# Patient Record
Sex: FEMALE | Race: WHITE
[De-identification: names, ages, dates, MRNs, and addresses within clinical notes are randomized per-mention and may not be internally consistent; named-entity substitution may affect disease eponyms.]

---

## 2023-02-22 ENCOUNTER — HOSPITAL ENCOUNTER (EMERGENCY)
Dept: HOSPITAL 97 - ER | Age: 38
Discharge: HOME | End: 2023-02-22
Payer: COMMERCIAL

## 2023-02-22 VITALS — OXYGEN SATURATION: 100 % | SYSTOLIC BLOOD PRESSURE: 182 MMHG | TEMPERATURE: 98.6 F | DIASTOLIC BLOOD PRESSURE: 93 MMHG

## 2023-02-22 DIAGNOSIS — H60.91: Primary | ICD-10-CM

## 2023-02-22 DIAGNOSIS — I10: ICD-10-CM

## 2023-02-22 PROCEDURE — 99281 EMR DPT VST MAYX REQ PHY/QHP: CPT

## 2023-02-22 NOTE — XMS REPORT
Continuity of Care Document

                          Created on:2023



Patient:SHANTELLE BURNS

Sex:Female

:1985

External Reference #:[id number]





Demographics







                          Address                   [address] [place] [address]

 

                          Home Phone                [phone]

 

                          Work Phone                [phone]

 

                          Mobile Phone              [phone]

 

                          Email Address             [email or web address]

 

                          Preferred Language        English

 

                          Marital Status            Unknown

 

                          Faith Affiliation     Unknown

 

                          Race                      Unknown

 

                          Ethnic Group              Unknown









Author







                          Organization              [facility]

t

 

                          Address                   [address]



                                                    [place] [address]

 

                          Phone                     [phone]









Support







                Name            Relationship    Address         Phone

 

                LISET BURNS X               [address]    [phone]



                                                [place] [address] 









Care Team Providers







                    Name                Role                Phone

 

                    DEEJAY FERRARA Primary Care Physician Unavailable

 

                    Silvana Duron Attending Clinician [phone]

 

                    Unknown, Attending  Attending Clinician Unavailable

 

                    SILVANA FERGUSON     Attending Clinician Unavailable

 

                    EDENILSON            Attending Clinician Unavailable

 

                    JENA CAMPBELL       Attending Clinician Unavailable

 

                    Jena Pang   Attending Clinician [phone]

 

                    Doctor Unassigned, No Name Attending Clinician Unavailable

 

                    Provider, [facility] Attending Clinician Unavailable

 

                    Mady Attending Clinician Unavailable

 

                    Amy Mcdowell     Attending Clinician [phone]

 

                    BETH            Attending Clinician Unavailable

 

                    JULY_OBEY            Admitting Clinician Unavailable

 

                    Mady Admitting Clinician Unavailable

 

                    ANDREW_JAMES            Admitting Clinician Unavailable









Payers







           Payer Name Policy Type Policy Number Effective Date Expiration Date S

susie

 

           [facility]: [place]            [id number] 2017            



           - [employer] (POS)                       00:00:00              







Problems







       Condition Condition Condition Status Onset  Resolution Last   Treating Co

mments 

Source



       Name   Details Category        Date   Date   Treatment Clinician        



                                                 Date                 

 

       Family Family Problem Active 2022                             [place] [facility]



       history of History of               2-07                               Co

mmuni



       diabetes Diabetes               00:00:                             ty



       mellitus Mellitus               00                                 Hospit

a



       in first in First                                                  l



       degree Degree                                                  Clinics



       relative Relative                                                  

 

       Routine Routine Problem Active                              [facility]



                       7-24                               Medi

marii



       care   Care                 00:00:                             



                                   00                                 

 

       No known No known Disease                                           Unive

rs



       active active                                                  ity of



       problems problems                                                  [facility]

 

       Abnormal Abnormal Problem Active                                    [facility]

a



       uterine Uterine                                                  Medical



       bleeding Bleeding                                                  







Allergies, Adverse Reactions, Alerts







       Allergy Allergy Status Severity Reaction(s) Onset  Inactive Treating Comm

ents 

Source



       Name   Type                        Date   Date   Clinician        

 

       ERYTHROHUAN DRUG   Active        Rash                         Univers



       YCIN                               4-24                        ity of



                                          00:00:                      [place]



                                          00                          [facility]

 

       Erythrom Propensi Active        Rash                         Univer

s



       ycin   ty to                       424                        ity of



              adverse                      00:00:                      [place]



              reaction                      00                          Medical



              s                                                       [facility]

 

       E-MYCIN Allergy Active Mild   Rash                               [facility]



              to                                                      Medical



              substanc                                                  



              e                                                       







Social History







           Social Habit Start Date Stop Date  Quantity   Comments   Source

 

           History of                       Cigarette Smoker            Universi

ty of



           tobacco use                                             [facility]

 

           Exposure to 2023 Not sure              [facility]



           SARS-CoV-2 00:00:00   17:16:00                         [facility]



           (event)                                                [place]

 

           Tobacco use and 2023 Smokeless tobacco            Un

iversity of



           exposure   00:00:00   00:00:00   non-user              [facility]

 

           Tobacco Comment 2023 Currently vapes            Univ

ersity of



                      00:00:00   00:00:00                         [facility]

 

           Sex Assigned At 1985                       Universit

y of



           Birth      00:00:00   00:00:00                         [facility]









                Smoking Status  Start Date      Stop Date       Source

 

                Tobacco smoking                                 [facility]

[place]



                consumption unknown                                 [facility]

ch

 

                Ex-smoker       2023 00:00:00 2023      [facility] o

f [place]



                                                00:00:00        [facility]







Medications







       Ordered Filled Start  Stop   Current Ordering Indication Dosage Frequency

 Signature

                    Comments            Components          Source



     Medication Medication Date Date Medication? Clinician                (SIG) 

          



     Name Name                                                   

 

     ofloxacin      2023- Yes       [id number] 5[drp]      Place 5       

    Univers



     0.3 % otic                                Drops in           ity 

of



     drops      00:00: 05:59                          right ear           [place]



               00   :00                           in the           Medical



                                                  morning           Branch



                                                  and 5           



                                                  Drops in           



                                                  the            



                                                  evening.           



                                                  Do all           



                                                  this for 7           



                                                  days.           

 

     SKYRIZI 150            Yes                      INJECT           Univ

ers



     mg/mL PnIj      2-15                               150MG (1           ity o

f



               00:00:                               PEN)           [place]



               00                                 SUBCUTANEO           Medical



                                                  USLY AT           Branch



                                                  WEEK 0 AND           



                                                  WEEK 4           

 

     penicillin      2023- No        [id number] 1.210                     U

nivers



     g                                                  ity of



     benzathine      21:15: 20:20                                         [place]



     (BICILLIN      00   :00                                          Medical



     L-A)                                                        Branch



     injection                                                        



     1.2 Million                                                        



     Units                                                        

 

     penicillin      2023- No        [id number] 1.210      1.2            U

nivers



     g                                   Million           ity of



     benzathine      21:15: 20:20                          Units,           [place]

[place]



     (BICILLIN      00   :00                           Intramuscu           Medi

marii



     L-A)                                         lar, ONCE,           Branch



     injection                                         1 dose, On           



     1.2 Million                                         Tue 1/3/23           



     Units                                         at 1515,           



                                                  ASAP<br>Re           



                                                  ason for           



                                                  Anti-Infec           



                                                  tive:           



                                                  Documented           



                                                  Infection<           



                                                  br>Documen           



                                                  jessica            



                                                  Infection           



                                                  Site:           



                                                  HEENT<br>D           



                                                  uration of           



                                                  Therapy:           



                                                  Other (see           



                                                  Comments)           

 

     lisinopriL      2022      Yes            10mg      Take 10 mg           U

nivers



     10 mg      1-18                               by mouth           ity of



     tablet      00:00:                               in the           [place]



                                                morning.           Medical



                                                                 Branch

 

     lisinopriL      2022      Yes            10mg      Take 10 mg           U

nivers



     10 mg      1-18                               by mouth           ity of



     tablet      00:00:                               in the           [place]



                                                morning.           Medical



                                                                 Branch

 

     lisinopriL      2022      Yes            10mg      Take 10 mg           U

nivers



     10 mg      1-18                               by mouth           ity of



     tablet      00:00:                               in the           [place]



                                                morning.           Medical



                                                                 Branch

 

     lidocaine lidocaine       No                       lidocaine         

  [place]



     (PF) 10 (PF) 10 3-24                               (PF) 10           Commun

i



     mg/mL (1 %) mg/mL (1 %) 10:47:                               mg/mL (1      

     ty



     injection injection 02                                 %)             [facility]

ta



     solutionTak solutionTak                                    injection       

    l



     e 2.1 mL by e 2.1 mL by                                    solutionTa      

     Clinics



     injection injection                                    ke 2.1 mL           



     route. route.                                    by             



                                                  injection           



                                                  route.           

 

     ceftriaxone ceftriaxone       No                       ceftriaxon    

       [place]



     1 gram 1 gram 3-24                               e 1 gram           Communi



     solution solution 10:42:                               solution           t

y



     for  for  41                                 for            Hospita



     injectionTa injectionTa                                    injectionT      

     l



     ke 1 g by ke 1 g by                                    donovan 1 g by          

 Clinics



     injection injection                                    injection           



     route. route.                                    route.           

 

     lidocaine lidocaine       No                       lidocaine         

  [place]



     (PF) 10 (PF) 10 3-23                               (PF) 10           Commun

i



     mg/mL (1 %) mg/mL (1 %) 11:09:                               mg/mL (1      

     ty



     injection injection 38                                 %)             [facility]

ta



     solutionTak solutionTak                                    injection       

    l



     e 2.1 mL by e 2.1 mL by                                    solutionTa      

     Clinics



     injection injection                                    ke 2.1 mL           



     route. route.                                    by             



                                                  injection           



                                                  route.           

 

     ceftriaxone ceftriaxone       No                       ceftriaxon    

       [place]



     1 gram 1 gram 3-23                               e 1 gram           Communi



     solution solution 11:08:                               solution           t

y



     for  for  35                                 for            Hospita



     injectionTa injectionTa                                    injectionT      

     l



     ke 1 g by ke 1 g by                                    donovan 1 g by          

 Clinics



     injection injection                                    injection           



     route. route.                                    route.           

 

     ondansetron            Yes            4mg       Take 1           Univ

ers



     (ZOFRAN, AS      4-24                               tablet by           ity

 of



     HYDROCHLORI      00:00:                               mouth           [place]



     DE,) 4 mg      00                                 every 8           Medical



     tablet                                         (eight)           Branch



                                                  hours as           



                                                  needed for           



                                                  Nausea and           



                                                  Vomiting           



                                                  (N/V).           

 

     traMADOL            Yes            50mg      Take 1           Univers



     (ULTRAM) 50      4-24                               tablet by           ity

 of



     mg tablet      00:00:                               mouth           [place]



               00                                 every 6           Medical



                                                  (six)           Branch



                                                  hours as           



                                                  needed for           



                                                  Pain           



                                                  (scale           



                                                  7-10).           

 

     pantoprazol            Yes            40mg      Take 1           Univ

ers



     e         4-24                               tablet by           ity of



     (PROTONIX)      00:00:                               mouth           [place]



     40 mg EC      00                                 daily.           Medical



     tablet                                                        Branch

 

     pantoprazol      2023- No             40mg      Take 1           Uni

vers



     e         4-24 -03                          tablet by           ity of



     (PROTONIX)      00:00: 00:00                          mouth           [place]



     40 mg EC      00   :00                           daily.           Medical



     tablet                                                        Branch

 

     ondansetron      2023- No             4mg       Take 1           Uni

vers



     (ZOFRAN, AS      4-24 -03                          tablet by           it

y of



     HYDROCHLORI      00:00: 00:00                          mouth           [place]

s



     DE,) 4 mg      00   :00                           every 8           Medical



     tablet                                         (eight)           Branch



                                                  hours as           



                                                  needed for           



                                                  Nausea and           



                                                  Vomiting           



                                                  (N/V).           

 

     traMADOL      2023- No             50mg      Take 1           Univer

s



     (ULTRAM) 50      4-24 -03                          tablet by           it

y of



     mg tablet      00:00: 00:00                          mouth           [place]



               00   :00                           every 6           Medical



                                                  (six)           Branch



                                                  hours as           



                                                  needed for           



                                                  Pain           



                                                  (scale           



                                                  7-10).           

 

     ceftriaxone ceftriaxone           No             1g        ceftriaxon      

     [place]



     1 gram 1 gram                                    e 1 gram           Communi



     solution solution                                    solution           ty



     for  for                                     for            Hospita



     injection injection                                    injection           

l



     Take 1 g by Take 1 g by                                    Take 1 g        

   Clinics



     injection injection                                    by             



     route. route.                                    injection           



                                                  route.           

 

     cefuroxime cefuroxime           No                       cefuroxime        

   [place]



     axetil 500 axetil 500                                    axetil 500        

   Communi



     mg tablet mg tablet                                    mg tablet           

ty



     TAKE 1 TAKE 1                                    TAKE 1           Hospita



     TABLET BY TABLET BY                                    TABLET BY           

l



     MOUTH TWICE MOUTH TWICE                                    MOUTH           

Clinics



     DAILY DAILY                                    TWICE           



                                                  DAILY           

 

     cholecalcif cholecalcif           No                       cholecalci      

     [place]



     jj jj                                    ferol           Communi



     (vitamin (vitamin                                    (vitamin           ty



     D3) 1,250 D3) 1,250                                    D3) 1,250           

[facility]osp[facility]



     mcg (50,000 mcg (50,000                                    mcg            l



     unit) unit)                                    (50,000           Clinics



     capsule capsule                                    unit)           



     TAKE ONE TAKE ONE                                    capsule           



     CAPSULE BY CAPSULE BY                                    TAKE ONE          

 



     MOUTH EVERY MOUTH EVERY                                    CAPSULE BY      

     



     WEEK WEEK                                    MOUTH           



                                                  EVERY WEEK           

 

     cyclobenzap cyclobenzap           No                       cyclobenza      

     [place]



     rine 10 mg rine 10 mg                                    prine 10          

 Communi



     tablet TAKE tablet TAKE                                    mg tablet       

    ty



     1 TABLET BY 1 TABLET BY                                    TAKE 1          

 Hospita



     MOUTH THREE MOUTH THREE                                    TABLET BY       

    l



     TIMES DAILY TIMES DAILY                                    MOUTH           

Clinics



     FOR 7 DAYS FOR 7 DAYS                                    THREE           



                                                  TIMES           



                                                  DAILY FOR           



                                                  7 DAYS           

 

     hydrochloro hydrochloro           No                       hydrochlor      

     [place]



     thiazide thiazide                                    othiazide           Co

mmuni



     12.5 mg 12.5 mg                                    12.5 mg           ty



     capsule capsule                                    capsule           Hospit

a



     TAKE 1 TAKE 1                                    TAKE 1           l



     CAPSULE BY CAPSULE BY                                    CAPSULE BY        

   Clinics



     MOUTH EVERY MOUTH EVERY                                    MOUTH           



     DAY  DAY                                     EVERY DAY           

 

     lidocaine lidocaine           No             2.1mL      lidocaine          

 [place]



     (PF) 10 (PF) 10                                    (PF) 10           Commun

i



     mg/mL (1 %) mg/mL (1 %)                                    mg/mL (1        

   ty



     injection injection                                    %)             [facility]

ta



     solution solution                                    injection           l



     Take 2.1 mL Take 2.1 mL                                    solution        

   [facility]



     by   by                                      Take 2.1           



     injection injection                                    mL by           



     route. route.                                    injection           



                                                  route.           

 

     nitrofurant nitrofurant           No                       nitrofuran      

     [facility]



     oin  oin                                     toin           [facility]



     macrocrysta macrocrysta                                    macrocryst      

     ty



     l 100 mg l 100 mg                                    al 100 mg           [facility]

[facility]



     capsule capsule                                    capsule           l



     Take 1 Take 1                                    Take 1           Clinics



     capsule capsule                                    capsule           



     every 6 every 6                                    every 6           



     hours by hours by                                    hours by           



     oral route oral route                                    oral route        

   



     for 7 days. for 7 days.                                    for 7           



                                                  days.           

 

     nitrofurant nitrofurant           No                       nitrofuran      

     [facility]



     oin  oin                                     toin           [facility]



     monohydrate monohydrate                                    monohydrat      

     ty



     /macrocryst /macrocryst                                    e/macrocry      

     [facility]



     als 100 mg als 100 mg                                    stals 100         

  l



     capsule capsule                                    mg capsule           Cli

nics

 

     phentermine phentermine           No                       phentermin      

     [facility]



     37.5 mg 37.5 mg                                    e 37.5 mg           Comm

uni



     tablet TAKE tablet TAKE                                    tablet          

 ty



     1 TABLET BY 1 TABLET BY                                    TAKE 1          

 [facility]



     MOUTH EVERY MOUTH EVERY                                    TABLET BY       

    l



     DAY  DAY                                     MOUTH           Clin[facility]



                                                  EVERY DAY           

 

     acetaminoph acetaminoph           No                       acetaminop      

     [facility]



     en 300 en 300                                    hen 300           Commun[facility]



     mg-codeine mg-codeine                                    mg-codeine        

   ty



     30 mg 30 mg                                    30 mg           [facility]



     tablet TAKE tablet TAKE                                    tablet          

 l



     1 TABLET BY 1 TABLET BY                                    TAKE 1          

 [facility]



     MOUTH EVERY MOUTH EVERY                                    TABLET BY       

    



     4 HOURS AS 4 HOURS AS                                    MOUTH           



     NEEDED FOR NEEDED FOR                                    EVERY 4           



     PAIN PAIN                                    HOURS AS           



                                                  NEEDED FOR           



                                                  PAIN           

 

     ceftriaxone ceftriaxone           No             1g        ceftriaxon      

     [facility]



     1 gram 1 gram                                    e 1 gram           Communi



     solution solution                                    solution           ty



     for  for                                     for            [facility]osp[facility]



     injection injection                                    injection           

l



     Take 1 g by Take 1 g by                                    Take 1 g        

   [facility]



     injection injection                                    by             



     route. route.                                    injection           



                                                  route.           

 

     acetaminoph acetaminoph           No                       acetaminop      

     [facility]



     en 300 en 300                                    hen 300           [facility]



     mg-codeine mg-codeine                                    mg-codeine        

   



     30 mg 30 mg                                    30 mg           



     tablet TAKE tablet TAKE                                    tablet          

 



     1 TABLET BY 1 TABLET BY                                    TAKE 1          

 



     MOUTH EVERY MOUTH EVERY                                    TABLET BY       

    



     4 HOURS AS 4 HOURS AS                                    MOUTH           



     NEEDED FOR NEEDED FOR                                    EVERY 4           



     PAIN PAIN                                    HOURS AS           



                                                  NEEDED FOR           



                                                  PAIN           

 

     cefuroxime cefuroxime           No                       cefuroxime        

   [facility]



     axetil 500 axetil 500                                    axetil 500        

   [facility]



     mg tablet mg tablet                                    mg tablet           

ty



     TAKE 1 TAKE 1                                    TAKE 1           [facility]osp[facility]



     TABLET BY TABLET BY                                    TABLET BY           

l



     MOUTH TWICE MOUTH TWICE                                    MOUTH           

Clinics



     DAILY DAILY                                    TWICE           



                                                  DAILY           

 

     cholecalcif cholecalcif           No                       cholecalci      

     [facility]



     jj jj                                    ferol           Commun[facility]



     (vitamin (vitamin                                    (vitamin           ty



     D3) 1,250 D3) 1,250                                    D3) 1,250           

[facility]



     mcg (50,000 mcg (50,000                                    mcg            l



     unit) unit)                                    (50,000           Clinics



     capsule capsule                                    unit)           



     TAKE ONE TAKE ONE                                    capsule           



     CAPSULE BY CAPSULE BY                                    TAKE ONE          

 



     MOUTH EVERY MOUTH EVERY                                    CAPSULE BY      

     



     WEEK WEEK                                    MOUTH           



                                                  EVERY WEEK           

 

     cyclobenzap cyclobenzap           No                       cyclobenza      

     [facility]



     rine 10 mg rine 10 mg                                    prine 10          

 [facility]



     tablet TAKE tablet TAKE                                    mg tablet       

    ty



     1 TABLET BY 1 TABLET BY                                    TAKE 1          

 [facility]



     MOUTH THREE MOUTH THREE                                    TABLET BY       

    l



     TIMES DAILY TIMES DAILY                                    MOUTH           

Clinics



     FOR 7 DAYS FOR 7 DAYS                                    THREE           



                                                  TIMES           



                                                  DAILY FOR           



                                                  7 DAYS           

 

     hydrochloro hydrochloro           No                       hydrochlor      

     [facility]



     thiazide thiazide                                    othiazide           Co

mmuni



     12.5 mg 12.5 mg                                    12.5 mg           ty



     capsule capsule                                    capsule           Hospit

a



     TAKE 1 TAKE 1                                    TAKE 1           l



     CAPSULE BY CAPSULE BY                                    CAPSULE BY        

   [facility]



     MOUTH EVERY MOUTH EVERY                                    MOUTH           



     DAY  DAY                                     EVERY DAY           

 

     lidocaine lidocaine           No             2.1mL      lidocaine          

 [facility]



     (PF) 10 (PF) 10                                    (PF) 10           Commun

i



     mg/mL (1 %) mg/mL (1 %)                                    mg/mL (1        

   ty



     injection injection                                    %)             [facility]

ta



     solution solution                                    injection           l



     Take 2.1 mL Take 2.1 mL                                    solution        

   [facility]



     by   by                                      Take 2.1           



     injection injection                                    mL by           



     route. route.                                    injection           



                                                  route.           

 

     nitrofurant nitrofurant           No                       nitrofuran      

     [facility]



     oin  oin                                     toin           [facility]



     macrocrysta macrocrysta                                    macrocryst      

     ty



     l 100 mg l 100 mg                                    al 100 mg           [facility]

[facility]



     capsule capsule                                    capsule           l



     Take 1 Take 1                                    Take 1           Cl[facility]



     capsule capsule                                    capsule           



     every 6 every 6                                    every 6           



     hours by hours by                                    hours by           



     oral route oral route                                    oral route        

   



     for 7 days. for 7 days.                                    for 7           



                                                  days.           

 

     nitrofurant nitrofurant           No                       nitrofuran      

     [facility]



     oin  oin                                     toin           Commun[facility]



     monohydrate monohydrate                                    monohydrat      

     ty



     /macrocryst /macrocryst                                    e/macrocry      

     [facility]



     als 100 mg als 100 mg                                    stals 100         

  l



     capsule capsule                                    mg capsule           Cli

nics

 

     phentermine phentermine           No             1    Q1D  phentermin      

     [facility]



     37.5 mg 37.5 mg                                    e 37.5 mg           Comm

uni



     tablet Take tablet Take                                    tablet          

 ty



     1 tablet 1 tablet                                    Take 1           Hos[facility]

ta



     every day every day                                    tablet           l



     by oral by oral                                    every day           Clin

ics



     route. route.                                    by oral           



                                                  route.           

 

     Contrave 8 Contrave 8           No             2    BID  Contrave 8        

   [facility]



     mg-90 mg mg-90 mg                                    mg-90 mg           Com

del



     tablet,exte tablet,exte                                    tablet,ext      

     ty



     nded nded                                    ended           [facility]



     release release                                    release           l



     Take 2 Take 2                                    Take 2           Clinics



     tablets tablets                                    tablets           



     twice a day twice a day                                    twice a         

  



     by oral by oral                                    day by           



     route for route for                                    oral route          

 



     90 days. 90 days.                                    for 90           



                                                  days.           

 

     lisinopril lisinopril           No                       lisinopril        

   [facility]



     10 mg 10 mg                                    10 mg           Commun[facility]



     tablet TAKE tablet TAKE                                    tablet          

 ty



     1 TABLET BY 1 TABLET BY                                    TAKE 1          

 [facility]



     MOUTH EVERY MOUTH EVERY                                    TABLET BY       

    l



     DAY  DAY                                     MOUTH           Clinics



                                                  EVERY DAY           

 

     cholecalcif cholecalcif           No                       cholecalci      

     [facility]



     jj jj                                    ferol           [facility]



     (vitamin (vitamin                                    (vitamin           



     D3) 1,250 D3) 1,250                                    D3) 1,250           



     mcg (50,000 mcg (50,000                                    mcg            



     unit) unit)                                    (50,000           



     capsule capsule                                    unit)           



     TAKE ONE TAKE ONE                                    capsule           



     CAPSULE BY CAPSULE BY                                    TAKE ONE          

 



     MOUTH EVERY MOUTH EVERY                                    CAPSULE BY      

     



     WEEK WEEK                                    MOUTH           



                                                  EVERY WEEK           

 

     Rybelsus 3 Rybelsus 3           No             1    Q1D  Rybelsus 3        

   [facility]



     mg tablet mg tablet                                    mg tablet           

Communi



     Take 1 Take 1                                    Take 1           ty



     tablet tablet                                    tablet           [facility]osp[facility]



     every day every day                                    every day           

l



     by oral by oral                                    by oral           Clinic

s



     route for route for                                    route for           



     90 days. 90 days.                                    90 days.           

 

     compounded compounded           No                       compounded        

   [facility]



     medication medication                                    medication        

   [facility]



     CATRACHO 2 CATRACHO 2                                    CATRACHO 2           ty



     (BUPROPION/ (BUPROPION/                                    (BUPROPION      

     [facility]



     PHENTERMINE PHENTERMINE                                    /PHENTERMI      

     l



     /TOPIRAMATE /TOPIRAMATE                                    NE/TOPIRAM      

     [facility]



     /NALTREXONE /NALTREXONE                                    ATE/NALTRE      

     



     /METHYLCOBA /METHYLCOBA                                    XONE/METHY      

     



     HEENA) SLOW HEENA) SLOW                                    LCOBALAMIN      

     



     RPNAAYA37/3 YBZJEVE35/3                                    ) SLOW          

 



     7.5/15/8/1 7.5/15/8/1                                    [id number]/        

   



     MG 1 tab PO MG 1 tab PO                                    37.5/15/8/      

     



     QD   QD                                      1 MG 1 tab           



                                                  PO QD           

 

     losartan 25 losartan 25           No             1    Q1D  losartan        

   [facility]



     mg tablet mg tablet                                    25 mg           Comm

uni



     Take 1 Take 1                                    tablet           ty



     tablet tablet                                    Take 1           [facility]



     every day every day                                    tablet           l



     by oral by oral                                    every day           Clin

ics



     route for route for                                    by oral           



     90 days. 90 days.                                    route for           



                                                  90 days.           

 

     cyclobenzap cyclobenzap           No             1    TID  cyclobenza      

     [facility]



     rine 10 mg rine 10 mg                                    prine 10          

 [facility]



     tablet Take tablet Take                                    mg tablet       

    ty



     1 tablet 3 1 tablet 3                                    Take 1           H

ospita



     times a day times a day                                    tablet 3        

   l



     by oral by oral                                    times a           Clinic

s



     route for 7 route for 7                                    day by          

 



     days. days.                                    oral route           



                                                  for 7           



                                                  days.           

 

     hydrochloro hydrochloro           No             1capsul Q1D  hydrochlor   

        [facility]



     thiazide thiazide                          e(s)      othiazide           Co

mmuni



     12.5 mg 12.5 mg                                    12.5 mg           ty



     capsule capsule                                    capsule           Hospit

a



     Take 1 Take 1                                    Take 1           l



     capsule capsule                                    capsule           Clinic

s



     every day every day                                    every day           



     by oral by oral                                    by oral           



     route. route.                                    route.           

 

     Medrol Medrol           No             1dose      Medrol           [facility]



     (Christiano) 4 mg (Christiano) 4 mg                          pk(s)      (Christiano) 4 mg       

    [facility]



     tablets in tablets in                                    tablets in        

   ty



     a dose pack a dose pack                                    a dose          

 [facility]



     Take 1 dose Take 1 dose                                    pack Take       

    l



     pk by oral pk by oral                                    1 dose pk         

  [facility]



     route as route as                                    by oral           



     directed. directed.                                    route as           



                                                  directed.           

 

     cholecalcif cholecalcif           No                       cholecalci      

     [facility]



     jj jj                                    ferol           Commun[facility]



     (vitamin (vitamin                                    (vitamin           ty



     D3) 1,250 D3) 1,250                                    D3) 1,250           

[facility]



     mcg (50,000 mcg (50,000                                    mcg            l



     unit) unit)                                    (50,000           Clinics



     capsule capsule                                    unit)           



     TAKE ONE TAKE ONE                                    capsule           



     CAPSULE BY CAPSULE BY                                    TAKE ONE          

 



     MOUTH EVERY MOUTH EVERY                                    CAPSULE BY      

     



     WEEK WEEK                                    MOUTH           



                                                  EVERY WEEK           

 

     cyclobenzap cyclobenzap           No                       cyclobenza      

     [facility]



     rine 10 mg rine 10 mg                                    prine 10          

 [facility]



     tablet TAKE tablet TAKE                                    mg tablet       

    ty



     1 TABLET BY 1 TABLET BY                                    TAKE 1          

 [facility]



     MOUTH THREE MOUTH THREE                                    TABLET BY       

    l



     TIMES DAILY TIMES DAILY                                    MOUTH           

Clinics



     FOR 7 DAYS FOR 7 DAYS                                    THREE           



                                                  TIMES           



                                                  DAILY FOR           



                                                  7 DAYS           

 

     hydrochloro hydrochloro           No                       hydrochlor      

     [facility]



     thiazide thiazide                                    othiazide           Co

mmuni



     12.5 mg 12.5 mg                                    12.5 mg           ty



     capsule capsule                                    capsule           Hospit

a



     TAKE 1 TAKE 1                                    TAKE 1           l



     CAPSULE BY CAPSULE BY                                    CAPSULE BY        

   Cl[facility]



     MOUTH EVERY MOUTH EVERY                                    MOUTH           



     DAY  DAY                                     EVERY DAY           

 

     phentermine phentermine           No             1    Q1D  phentermin      

     [facility]



     37.5 mg 37.5 mg                                    e 37.5 mg           Comm

uni



     tablet Take tablet Take                                    tablet          

 ty



     1 tablet 1 tablet                                    Take 1           Hos[facility]

ta



     every day every day                                    tablet           l



     by oral by oral                                    every day           Clin

ics



     route. route.                                    by oral           



                                                  route.           

 

     cyclobenzap cyclobenzap           No                       cyclobenza      

     [facility]



     rine 10 mg rine 10 mg                                    prine 10          

 Medical



     tablet TAKE tablet TAKE                                    mg tablet       

    



     1 TABLET BY 1 TABLET BY                                    TAKE 1          

 



     MOUTH THREE MOUTH THREE                                    TABLET BY       

    



     TIMES DAILY TIMES DAILY                                    MOUTH           



     AS NEEDED AS NEEDED                                    THREE           



     FOR MUSCLE FOR MUSCLE                                    TIMES           



     SPASM SPASM                                    DAILY AS           



                                                  NEEDED FOR           



                                                  MUSCLE           



                                                  SPASM           

 

     ceftriaxone ceftriaxone           No             1g        ceftriaxon      

     [facility]



     1 gram 1 gram                                    e 1 gram           Commun[facility]



     solution solution                                    solution           ty



     for  for                                     for            [facility]



     injection injection                                    injection           

l



     Take 1 g by Take 1 g by                                    Take 1 g        

   [facility]



     injection injection                                    by             



     route. route.                                    injection           



                                                  route.           

 

     cholecalcif cholecalcif           No                       cholecalci      

     [place]



     jj jj                                    ferol           Communi



     (vitamin (vitamin                                    (vitamin           ty



     D3) 1,250 D3) 1,250                                    D3) 1,250           

Hospita



     mcg (50,000 mcg (50,000                                    mcg            l



     unit) unit)                                    (50,000           Clinics



     capsule capsule                                    unit)           



     TAKE ONE TAKE ONE                                    capsule           



     CAPSULE BY CAPSULE BY                                    TAKE ONE          

 



     MOUTH EVERY MOUTH EVERY                                    CAPSULE BY      

     



     WEEK WEEK                                    MOUTH           



                                                  EVERY WEEK           

 

     cyclobenzap cyclobenzap           No                       cyclobenza      

     [place]



     rine 10 mg rine 10 mg                                    prine 10          

 Communi



     tablet TAKE tablet TAKE                                    mg tablet       

    ty



     1 TABLET BY 1 TABLET BY                                    TAKE 1          

 Hospita



     MOUTH THREE MOUTH THREE                                    TABLET BY       

    l



     TIMES DAILY TIMES DAILY                                    MOUTH           

Clinics



     FOR 7 DAYS FOR 7 DAYS                                    THREE           



                                                  TIMES           



                                                  DAILY FOR           



                                                  7 DAYS           

 

     hydrochloro hydrochloro           No                       hydrochlor      

     [place]



     thiazide thiazide                                    othiazide           Co

mmuni



     12.5 mg 12.5 mg                                    12.5 mg           ty



     capsule capsule                                    capsule           Hospit

a



     TAKE 1 TAKE 1                                    TAKE 1           l



     CAPSULE BY CAPSULE BY                                    CAPSULE BY        

   Clinics



     MOUTH EVERY MOUTH EVERY                                    MOUTH           



     DAY  DAY                                     EVERY DAY           

 

     lidocaine lidocaine           No             2.1mL      lidocaine          

 [place]



     (PF) 10 (PF) 10                                    (PF) 10           Commun

i



     mg/mL (1 %) mg/mL (1 %)                                    mg/mL (1        

   ty



     injection injection                                    %)             Hospi

ta



     solution solution                                    injection           l



     Take 2.1 mL Take 2.1 mL                                    solution        

   Clinics



     by   by                                      Take 2.1           



     injection injection                                    mL by           



     route. route.                                    injection           



                                                  route.           

 

     nitrofurant nitrofurant           No             1capsul Q6H  nitrofuran   

        [place]



     oin  oin                           e(s)      toin           Communi



     macrocrysta macrocrysta                                    macrocryst      

     ty



     l 100 mg l 100 mg                                    al 100 mg           Ho

spita



     capsule capsule                                    capsule           l



     Take 1 Take 1                                    Take 1           Clinics



     capsule capsule                                    capsule           



     every 6 every 6                                    every 6           



     hours by hours by                                    hours by           



     oral route oral route                                    oral route        

   



     for 7 days. for 7 days.                                    for 7           



                                                  days.           

 

     nitrofurant nitrofurant           No                       nitrofuran      

     [place]



     oin  oin                                     toin           Communi



     monohydrate monohydrate                                    monohydrat      

     ty



     /macrocryst /macrocryst                                    e/macrocry      

     Hospita



     als 100 mg als 100 mg                                    stals 100         

  l



     capsule capsule                                    mg capsule           Cli

nics

 

     phentermine phentermine           No                       phentermin      

     [place]



     37.5 mg 37.5 mg                                    e 37.5 mg           Comm

uni



     tablet TAKE tablet TAKE                                    tablet          

 ty



     1 TABLET BY 1 TABLET BY                                    TAKE 1          

 Hospita



     MOUTH EVERY MOUTH EVERY                                    TABLET BY       

    l



     DAY  DAY                                     MOUTH           Clinics



                                                  EVERY DAY           

 

     acetaminoph acetaminoph           No                       acetaminop      

     [place]



     en 300 en 300                                    hen 300           Communi



     mg-codeine mg-codeine                                    mg-codeine        

   ty



     30 mg 30 mg                                    30 mg           Hospita



     tablet TAKE tablet TAKE                                    tablet          

 l



     1 TABLET BY 1 TABLET BY                                    TAKE 1          

 Clinics



     MOUTH EVERY MOUTH EVERY                                    TABLET BY       

    



     4 HOURS AS 4 HOURS AS                                    MOUTH           



     NEEDED FOR NEEDED FOR                                    EVERY 4           



     PAIN PAIN                                    HOURS AS           



                                                  NEEDED FOR           



                                                  PAIN           

 

     lisinopril lisinopril           No             1    Q1D  lisinopril        

   [facility]



     10 mg 10 mg                                    10 mg           Medical



     tablet Take tablet Take                                    tablet          

 



     1 tablet 1 tablet                                    Take 1           



     every day every day                                    tablet           



     by oral by oral                                    every day           



     route. route.                                    by oral           



                                                  route.           

 

     Mirena 20 Mirena 20           No             1device      Mirena 20        

   [facility]



     mcg/24 mcg/24                          (s)       mcg/24           Medical



     hours (8 hours (8                                    hours (8           



     yrs) 52 mg yrs) 52 mg                                    yrs) 52 mg        

   



     intrauterin intrauterin                                    intrauteri      

     



     e device e device                                    ne device           



     Take 1 Take 1                                    Take 1           



     device by device by                                    device by           



     intrauterin intrauterin                                    intrauteri      

     



     e route. e route.                                    ne route.           







Immunizations







           Ordered Immunization Filled Immunization Date       Status     Commen

ts   Source



           Name       Name                                        

 

           influenza, influenza, 2015 Completed             [facility]



           unspecified unspecified 00:00:00                         



           formulation formulation                                  







Vital Signs







             Vital Name   Observation Time Observation Value Comments     Source

 

             Systolic blood 2023 23:23:00 153 mm[Hg]                [facility]

[facility]

 

             Diastolic blood 2023 23:23:00 92 mm[Hg]                 [facility]

[facility]

 

             Heart rate   2023 23:21:00 52 /min                   [facility]

 

             Body temperature 2023 23:21:00 36.56 Sayra                 [facility]

 

             Respiratory rate 2023 23:21:00 16 /min                   [facility]

 

             Body weight  2023 23:21:00 124.286 kg                [facility]

[facility]

 

             BMI          2023 23:21:00 47.03 kg/m2               [facility]

 

             Oxygen saturation in 2023 23:21:00 98 /min                   

[facility]



             Arterial blood by                                        [facility]

[facility]



             Pulse oximetry                                        [facility]

 

             BP Diastolic 2023 00:00:00 86 mm[Hg]                 [facility]

s

 

             Height       2023 00:00:00 63 [in_i]                 [facility]

s

 

             BMI (Body Mass 2023 00:00:00 48.2 kg/m2                [facility]

s

 

             BP Systolic  2023 00:00:00 143 mm[Hg]                [facility]

s

 

             Body Weight  2023 00:00:00 4352 [oz_av]              [facility]

s

 

             Systolic blood 2023 20:01:00 136 mm[Hg]                [facility]

[facility]



             pressure                                            [facility]

 

             Diastolic blood 2023 20:01:00 87 mm[Hg]                 [facility]

[facility]



             [facility]

 

             Heart rate   2023 20:01:00 87 /min                   [facility]

 

             Body temperature 2023 20:01:00 37.67 Sayra                 [facility]

 

             Respiratory rate 2023 20:01:00 18 /min                   [facility]

 

             Body height  2023 20:01:00 162.6 cm                  [facility]

 

             Body weight  2023 20:01:00 125.329 kg                [facility]

 

             BMI          2023 20:01:00 47.43 kg/m2               [facility]

 

             Oxygen saturation in 2023 20:01:00 99 /min                   

[facility]



             Arterial blood by                                        [facility]

[facility]



             Pulse oximetry                                        [facility]

 

             BP Diastolic 2022 00:00:00 84 mm[Hg]                 [facility]

s

 

             Height       2022 00:00:00 63 [in_i]                 [facility]

s

 

             BMI (Body Mass 2022 00:00:00 47.8 kg/m2                [facility]



             Index)                                              Hospital Clinic

s

 

             BP Systolic  2022 00:00:00 119 mm[Hg]                [facility]

s

 

             Body Weight  2022 00:00:00 4320 [oz_av]              [facility]

s

 

             BP Diastolic 2022 00:00:00 95 mm[Hg]                 [facility] 

[facility]

 

             Height       2022 00:00:00 64 [in_i]                 [facility]

edical

 

             BMI (Body Mass 2022 00:00:00 47.4 kg/m2                [facility]



             Index)                                              

 

             BP Systolic  2022 00:00:00 149 mm[Hg]                [facility]

ed[facility]

 

             Body Weight  2022 00:00:00 276 [lb_av]               [facility]

edical

 

             BP Diastolic 2022 00:00:00 85 mm[Hg]                 [facility]

s

 

             Height       2022 00:00:00 63 [in_i]                 [facility]

s

 

             BMI (Body Mass 2022 00:00:00 47.2 kg/m2                [facility])                                              Hospital Clinic

s

 

             BP Systolic  2022 00:00:00 141 mm[Hg]                [facility]

s

 

             Body Weight  2022 00:00:00 4264 [oz_av]              [facility]

s

 

             BP Diastolic 2022 00:00:00 88 mm[Hg]                 [facility]

s

 

             Height       2022 00:00:00 63 [in_i]                 [facility]

s

 

             BMI (Body Mass 2022 00:00:00 47.5 kg/m2                [facility]                                              Hospital Clinic

s

 

             BP Systolic  2022 00:00:00 121 mm[Hg]                [facility]

s

 

             Body Weight  2022 00:00:00 4288 [oz_av]              [facility]

s

 

             BP Diastolic 2022 00:00:00 78 mm[Hg]                 [facility]

s

 

             Height       2022 00:00:00 63 [in_i]                 [facility]

s

 

             BMI (Body Mass 2022 00:00:00 47.5 kg/m2                [facility]                                              [facility]ospital Clinic

s

 

             BP Systolic  2022 00:00:00 165 mm[Hg]                [facility]

s

 

             Body Weight  2022 00:00:00 4289.6 [oz_av]              [facility]

s

 

             BP Diastolic 2022 00:00:00 76 mm[Hg]                 [facility]

s

 

             Height       2022 00:00:00 63 [in_i]                 [facility]

s

 

             BMI (Body Mass 2022 00:00:00 48 kg/m2                  [facility]                                              [facility]

s

 

             BP Systolic  2022 00:00:00 139 mm[Hg]                [facility]

s

 

             Body Weight  2022 00:00:00 4336 [oz_av]              [facility]

s

 

             BP Diastolic 2022-02-15 00:00:00 74 mm[Hg]                 [facility]

s

 

             Height       2022-02-15 00:00:00 63 [in_i]                 [facility]

s

 

             BMI (Body Mass 2022-02-15 00:00:00 49.1 kg/m2                [facility])                                              [facility] Clinic

s

 

             BP Systolic  2022-02-15 00:00:00 146 mm[Hg]                [facility]

s

 

             Body Weight  2022-02-15 00:00:00 4437 [oz_av]              [facility]

s







Procedures







                Procedure       Date / Time     Performing Clinician Source



                                Performed                       

 

                POCT SARS-COV-2 ANTIGEN 2023 20:04:00 Almaz Obregon    [facility]



                (BINAX NOW)                                     [facility]

 

                POCT MOLECULAR STREP 2023 19:57:00 Unknown, Attending [facility]

 

                ASSIGNMENT OF BENEFITS 2023 19:47:59 Doctor Unassigned, Un

[facility]



                                                Portal         Medical Branch

 

                electrocardiogram 2022-02-15 00:00:00                 [facility]

 

                Cholecystectomy 2016 00:00:00                 [facility]

al



                (Gallbladder)                                   

 

                Caesarean Section 2015 00:00:00                 [facility]

ical

 

                Dilation and Curettage 2014 00:00:00                 [facility]

[facility]

 

                Tonsillectomy   2009 00:00:00                 [facility]

al







Plan of Care







             Planned Activity Planned Date Details      Comments     Source

 

             Diagnostic Test 2022   Cytomegalovirus Ab              [facility]

 [facility]



             Pending      00:00:00     [Titer] in Serum or              



                                       Plasma by Latex              



                                       agglutination [code =              



                                       5121-9]                   

 

             Diagnostic Test 2022   Weight of 24 hour              [facility] 

[facility]



             Pending      00:00:00     Specimen [code = 3153-4]              

 

             Diagnostic Test 2022   Indirect antiglobulin              [facility]

[facility]



             Pending      00:00:00     test.IgG specific              



                                       reagent [Presence] in              



                                       Serum or Plasma [code =              



                                       1005-8]                   

 

             Diagnostic Test 2022   unlisted lab [code =              [facility]

[facility]



             Pending      00:00:00     unlisted lab]              

 

             Diagnostic Test 2022   Weight of 24 hour              [facility]



             Pending      00:00:00     Specimen [code = 3153-4]              

 

             Diagnostic Test 2022   TSH + free T4, serum              [facility]

[facility]



             Pending      00:00:00     [code = TSH + free T4,              



                                       serum]                    

 

             Diagnostic Test 2022   Grapefruit IgE Ab              [facility] 

[facility]



             Pending      00:00:00     [Units/volume] in Serum              



                                       [code = 6131-7]              

 

             Diagnostic Test 2022   vitamin D, 25-hydroxy,              [facility]

[facility]



             Pending      00:00:00     total, serum [code =              



                                       vitamin D, 25-hydroxy,              



                                       total, serum]              

 

             Diagnostic Test 2022   vitamin B12 + folate,              [facility]

[facility]



             Pending      00:00:00     serum or blood [code =              



                                       vitamin B12 + folate,              



                                       serum or blood]              

 

             Diagnostic Test 2022   creatine, serum [code =              [facility]

[facility]



             Pending      00:00:00     creatine, serum]              

 

             Diagnostic Test 2022   HbA1c (hemoglobin A1c),              [facility]

[facility]



             Pending      00:00:00     blood [code = HbA1c              



                                       (hemoglobin A1c), blood]              

 

             Diagnostic Test 2022-02-15   urinalysis, dipstick              [facility]



             Pending      00:00:00     [code = urinalysis,              [facility]

 Clinics



                                       dipstick]                 

 

             Diagnostic Test 2022-02-15   HbA1c (hemoglobin A1c),              S

[facility]



             Pending      00:00:00     blood [code = HbA1c              [facility]

 Clinics



                                       (hemoglobin A1c), blood]              

 

             Diagnostic Test 2022-02-15   lipid panel, serum [code              

[facility]



             Pending      00:00:00     = lipid panel, serum]              [facility]

 

             Diagnostic Test 2022-02-15   TSH + free T4, serum              [facility]



             Pending      00:00:00     [code = TSH + free T4,              [facility]



                                       serum]                    

 

             Diagnostic Test 2022-02-15   insulin, serum [code =              [facility]



             Pending      00:00:00     insulin, serum]              [facility]

 

             Diagnostic Test 2022-02-15   vitamin D, 25-hydroxy,              [facility]



             Pending      00:00:00     total, serum [code =              [facility]



                                       vitamin D, 25-hydroxy,              



                                       total, serum]              

 

             Diagnostic Test 2022-02-15   vitamin B12 + folate,              [facility]



             Pending      00:00:00     serum or blood [code =              [facility]



                                       vitamin B12 + folate,              



                                       serum or blood]              

 

             Diagnostic Test 2022-02-15   CMP, serum or plasma              [facility]



             Pending      00:00:00     [code = CMP, serum or              [facility]



                                       plasma]                   

 

             Diagnostic Test 2022-02-15   CBC w/ auto diff [code =              

[facility]



             Pending      00:00:00     CBC w/ auto diff]              [facility]

linics







Encounters







        Start   End     Encounter Admission Attending Care    Care    Encounter 

Source



        Date/Time Date/Time Type    Type    Clinicians Facility Department ID   

   

 

        2023 Urgent          Silvana Ferguson [facility]    [id number]

[id number] [facility]



        17:20:00 17:40:00 Care            Unknown, Attending [facility]  [id number]

         itEver [id number]         Fady

as



                                                MERCEDES?BLEA [phone]         Me

anai



                                                [address]



                                                MEDICAL                 



                                                OFFICE                  



                                                BUILDING                 

 

        2023 Outpatient R       [place], [facility]    [id number]

[id number] [facility]



        17:20:00 17:20:00                 REEROOSEVELTU                           ity [facility]

 

        2023 Outpatient         JULY_C [facility]    [id number]-

 [place]



        00:00:00 00:00:00                                         0106    Commun

i



                                                                        ty



                                                                        Hospita



                                                                        l



                                                                        Clinics

 

        2023 [facility]    [place] [id number]

06 [place]



        00:00:00 00:00:00 July,                         [facility]         Comm

uni



                        MSN, APRN,                         Hospital -         ty



                        FNP-C: [address]                         [place]          Hospi

[facility]         l



                        [place],                         [facility],         Clinic

s



                        [address],                         [facility]         



                        [address],                         [place], [place]                         MSN, FNP-C         



                        [id number]                                         



                        , Ph.                                           



                        [phone]                                         

 

        [facility]    [id number]

[id number] [facility]



        13:40:00 14:41:25                 JENA                          [facility]

 

        2023 Urgent          [place], [facility]    [id number] 

[id number] Univers



        13:40:00 14:00:00 Care            Unknown, Attending HEALTH  [id number]

         ity of



                                                [place] 4.2.7.2.686         Fady

as



                                                MERCEDES?BLEA [phone]         [address]



                                                MEDICAL                 



                                                OFFICE                  



                                                BUILDING                 

 

        2023 Orders          Doctor  MARKEL    [id number]

[id number] Univers



        00:00:00 00:00:00 Only            Unassigned, JANES   [id number]       

  ity of



                                        [facility] 4.2.7.2.686         Fady

as



                                                        [phone]         [address]

 

        2023 Letter          Provider, [facility]    [id number]

0901 [facility]



        00:00:00 00:00:00 (Out)           Ang Db  HEALTH  350.1.13.10         it

y of



                                        [facility] [place] 4.2.7.2.686        

 [place]



                                                MERCEDES?BLEA [phone]         [address]



                                                MEDICAL                 



                                                OFFICE                  



                                                BUILDING                 

 

        2022 Outpatient         SISSON_C [facility]    2022 [place]



        00:00:00 00:00:00                                         1207    Commun

i



                                                                        ty



                                                                        Hospita



                                                                        l



                                                                        Clinics

 

        2022 Amy                 [facility]    [place] 2022 [place]



        00:00:00 00:00:00 July                         [facility]         Comm

uni



                        MSN, APRN,                         Hospital -         ty



                        FNP-C: [address]                         [place]          Hospi

ta



                        N.                              [facility],         Clinic

s



                        [address],                         [facility]         



                        [address],                         [place], [place]                         MSN, FNP-C         



                        [phone]                                         



                        , Ph.                                           



                        [phone]                                         

 

        2022 Outpatient         GC_[facility]_ PRIV    PRIV    503

7217[facility]20 [facility]



        00:00:00 00:00:00                 Monique                 [id number]  Medi

marii

 

        [facility]



        00:00:00 00:00:00 [facility]



                        Bharat,                         MARICRUZ_MARGI_         



                        MD: [address]                         [place]         



                        RADHA Calderon,                         Office          



                        [place], [place]                                              



                        [address]                                         



                        , Ph.                                           



                        [phone]                                         

 

        2022 Outpatient         GC_[facility]_ PRIV    PRIV    503

7217-20 [facility]



        00:00:00 00:00:00                 Monique                 [id number]  Medi

marii

 

        2022 Outpatient         GC_[facility]_ PRIV    PRIV    503

7217-20 [facility]



        00:00:00 00:00:00                 Monique                 [id number]  Medi

marii

 

        2022 Outpatient         [facility]ISSON_C [facility]    2022 [place]



        03:45:00 03:45:00                                         0401    Commun

i



                                                                        ty



                                                                        Hospita



                                                                        l



                                                                        Clinics

 

        [facility]    [place]  [place]



        00:00:00 00:00:00 July                         [facility]         Comm

uni



                        MSN, APRN,                         Hospital -         ty



                        FNP-C: [address]                         [place]          Hospi

ta



                        N.                              Hospital         l



                        [place],                         Clinic,         Clinic

s



                        [address],                         Amy         



                        [address],                         [place], [place]                         MSN, FNP-C         



                        [address]                                         



                        , Ph.                                           



                        [phone]                                           



                        [phone]                                         

 

        2022 Outpatient         July [facility]    [id number]

2-b 



        00:00:00 00:00:00                 Amy                 [id number] 



                                                                [id number] 



                                                                [id number]  

 

        2022 Outpatient         [facility]SON_C [facility]    [id number] [place]



        11:39:00 11:39:00                                         0324    Commun

i



                                                                        ty



                                                                        Hospita



                                                                        l



                                                                        [facility]

 

        2022 Outpatient         July [facility]    [id number]

2-a 



        00:00:00 00:00:00                 Amy                 [id number] 



                                                                [id number] 



                                                                [id number]  

 

        2022 [facility]    [place]  [place]



        00:00:00 00:00:00 [facility]                         [facility]         Comm

uni



                        MSN, APRN,                         Hospital -         ty



                        FNP-C: [address]                         [place]          [facility],         Clinic

s



                        [address],                         Amy         



                        [address],                         [place], [place]                         MSN, FNP-C         



                        [address]                                         



                        , Ph.                                           



                        [phone]                                           



                        [phone]                                         

 

        2022 Outpatient         [facility]SON_C [facility]    [id number] [place]



        12:06:00 12:06:00                                         0323    Commun

i



                                                                        ty



                                                                        Hospita



                                                                        l



                                                                        Clinics

 

        2022 Outpatient         July [facility]    [id number]

6-a 



        00:00:00 00:00:00                 Amy                 [id number] 



                                                                [id number] 



                                                                [id number]  

 

        2022 [facility]    [place]  [place]



        00:00:00 00:00:00 [facility],                         [facility]         Comm

uni



                        MSN, APRN,                         Hospital -         ty



                        FNP-C: [address]                         [place]          Hospi

ta



                        [facility],                         Clinic,         Clinic

s



                        [address],                         [place]         



                        [address],                         [place], [place]                         MSN, FNP-C         



                        [phone]                                         



                        , Ph.                                           



                        [phone]                                         

 

        2022 Outpatient         [facility]_C [facility]    [id number] [place]



        03:05:00 03:05:00                                         0301    Commun

i



                                                                        ty



                                                                        Hospita



                                                                        l



                                                                        [facility]

 

        2022 Outpatient         July [facility]    [id number]

e-[id number] 



        00:00:00 00:00:00                 Amy                 [id number] 



                                                                [id number] 



                                                                [id number]  

 

        2022 [facility]    [place]  [place]



        00:00:00 00:00:00 July                         [facility]

uni



                        MSN, APRN,                         Hospital -         ty



                        FNP-C: [address]                         [place]          [facility]

[facility]

s



                        [address],                         Amy         



                        [address],                         [place], [place]                         MSN, P-C         



                        [id number]                                         



                        , Ph.                                           



                        [phone]                                         

 

        2022 Outpatient         [facility]ISSON_C [facility]    [id number] [place]



        02:47:00 02:47:00                                         0217    Commun

i



                                                                        ty



                                                                        Hospita



                                                                        l



                                                                        [facility]

 

        2022-02-15 2022-02-15 Outpatient         [facility]ISSON_C [facility]    [id number] [place]



        03:13:00 03:13:00                                         0215    Commun

i



                                                                        ty



                                                                        Hospita



                                                                        l



                                                                        [facility]

 

        2022-02-15 2022-02-15 Outpatient         July, [facility]    [id number]

8-8 



        00:00:00 00:00:00                 Amy                 [id number] 



                                                                [id number] 



                                                                [id number]  

 

        2022-02-15 2022-02-15 [facility]    [place] [id number]

[id number] [place]



        00:00:00 00:00:00 July,                         [facility]         Comm

uni



                        MSN, APRN,                         Hospital -         ty



                        FNP-C: [address]                         [place]          [facility]

[facility],         Clinic

s



                        [address],                         Amy         



                        [address],                         [place], [place]                         MSN, FNP-C         



                        [phone]                                         



                        , Ph.                                           



                        [phone]                                         

 

        2022-01-10 2022-01-10 Outpatient         SISKEVIN_C [facility]    2022 [place]



        05:55:00 05:55:00                                         0110    Commun

i



                                                                        ty



                                                                        Hospita



                                                                        l



                                                                        Clinics

 

        2022 Outpatient         [facility]ISSON_C [facility]    2022 [place]



        04:16:00 04:16:00                                         0103    Commun

i



                                                                        ty



                                                                        Hospita



                                                                        l



                                                                        [facility]

 

        2021 Outpatient         KEFFER_A [facility]    2021 [place]



        10:34:00 10:34:00                                         1202    Commun

i



                                                                        ty



                                                                        Hospita



                                                                        l



                                                                        [facility]

 

        2021 Outpatient         GC_[facility]_ PRIV    PRIV    503

7217-20 [facility]



        00:00:00 00:00:00                 Monique                 [id number]  Medi

marii







Results







           Test Description Test Time  Test Comments Results    Result Comments 

Source









                    POCT SARS-COV-2 ANTIGEN (BINAX NOW) 2023 20:04:00 









                      Test Item  Value      Reference Range Interpretation Comme

nts









             POCT SARS-COV-2 ANTIGEN (test code Not Detected Not Detected       

       



             = 91391-9)                                          

 

             On board controls acceptable with Yes                              

      



             C Line (test code = 3574)                                        

 

             ALEXANDRA (test code = ALEXANDRA) accurate development and                     

      



                          interpretation of all internal                        

   



                          controls                               

 

             Lab Interpretation (test code = Normal                             

    



             16662-6)                                            



[facility] MOLECULAR [id number] 20:01:17





             Test Item    Value        Reference Range Interpretation Comments

 

             POCT Molecular Strep (test code = Positive     Negative     A      

      



             77709-7)                                            

 

             Lab Interpretation (test code = Abnormal                           

    



             95220-0)                                            



[facility] panel - Blood by Automated [id number]
00:00:00





             Test Item    Value        Reference Range Interpretation Comments

 

             WBC (test code = WBC) 10.0 10      3.7-12.0                  

 

             RBC (test code = RBC) 4.27 10      3.60-5.50                 

 

             HGB (test code = HGB) 12.9 g/dL    11.5-15.6                 

 

             HCT (test code = HCT) 39.7 %       34.5-46.5                 

 

             MCV (test code = MCV) 92.8 um      80.0-102.0                

 

             MCH (test code = MCH) 30.2 pg      25.0-34.1                 

 

             MCHC (test code = MCHC) 32.5 g/dL    29.0-35.0                 

 

             RDW (test code = RDW) 14.0 %       10.9-16.9                 

 

             plt (test code = plt) 305 10       136-392                   

 

             MPV (test code = MPV) 9.3 um       7.4-11.1                  

 

             gran % (test code = gran %) 44.3 %       36.0-78.0                 

 

             lymph % (test code = lymph %) 40.2 %       12.0-48.0               

  

 

             mono % (test code = mono %) 7.1 %        0.0-13.0                  

 

             eos % (test code = eos %) 7 %          0-8                       

 

             baso % (test code = baso %) 1 %          0-2                       

 

             gran # (test code = gran #) 4.5 10       1.2-6.8                   

 

             lymph # (test code = lymph #) 4.0 10       1.2-3.2      H          

  

 

             mono # (test code = mono #) 0.7 10       0.3-0.8                   

 

             eos # (test code = eos #) 0.7 10       0.0-0.2      H            

 

             baso # (test code = baso #) 0.1 10       0.0-0.2                   



[facility] panel - Urine2022-02-15 13:45:00





             Test Item    Value        Reference Range Interpretation Comments

 

             Leukocytes (test code = Negative                               



             Leukocytes)                                         

 

             Nitrite (test code = Nitrite) negative                             

  

 

             Urobilinogen (test code = .2                                     



             Urobilinogen)                                        

 

             Protein (test code = Protein) Negative                             

  

 

             pH (test code = pH) 6.5                                    

 

             Blood (test code = Blood) Negative                               

 

             Specific Gravity (test code = 1.010                                

  



             Specific Gravity)                                        

 

             Ketone (test code = Ketone) Negative                               

 

             Bilirubin (test code = Bilirubin) Negative                         

      

 

             Glucose (test code = Glucose) Negative                             

  

 

             Appearance (test code = Clear                                  



             Appearance)                                         

 

             Color (test code = Color) Pale Yellow                            



[facility]

## 2023-02-22 NOTE — ER
Nurse's Notes                                                                                     

 [facility]                                                                 

Name: Surekha Mandel                                                                          

Age: 37 yrs                                                                                       

Sex: Female                                                                                       

: 1985                                                                                   

MRN: [id number]                                                                                   

Arrival Date: 2023                                                                          

Time: 18:09                                                                                       

Account#: [id number]                                                                            

Bed IW7                                                                                           

Private MD: Amy Mcdowell                                                                       

Diagnosis: Unspecified otitis externa, right ear                                                  

                                                                                                  

Presentation:                                                                                     

                                                                                             

18:17 Chief complaint: Patient states: right ear bleeding yesterday, urgent care says blood   [facility] 

      covering ear drum and unsure if infection and gave pt drops to use cause she couldn't       

      tell if it was ruptured either. Coronavirus screen: Vaccine status: Patient reports         

      receiving the 2nd dose of the covid vaccine. Client denies travel out of the [place] in        

      the last 14 days. Ebola Screen: Patient negative for fever greater than or equal to         

      101.5 degrees Fahrenheit, and additional compatible Ebola Virus Disease symptoms            

      Patient denies exposure to infectious person. Patient denies travel to an                   

      Ebola-affected area in the 21 days before illness onset. Initial Sepsis Screen: Does        

      the patient meet any 2 criteria? No. Patient's initial sepsis screen is negative. Does      

      the patient have a suspected source of infection? No. Patient's initial sepsis screen       

      is negative. Risk Assessment: Do you want to hurt yourself or someone else? Patient         

      reports no desire to harm self or others.                                                   

18:17 Method Of Arrival: Ambulatory                                                           [facility] 

18:17 Acuity: GABBIE 3                                                                           5 

                                                                                                  

Triage Assessment:                                                                                

18:21 General: Appears uncomfortable, Behavior is calm, cooperative, appropriate for age.     [facility] 

      Pain: Complains of pain in right ear.                                                       

                                                                                                  

OB/GYN:                                                                                           

18:21 LMP N/A - Irregular menses                                                              [facility] 

                                                                                                  

Historical:                                                                                       

- Allergies:                                                                                      

18:21 No Known Allergies;                                                                     5 

- PMHx:                                                                                           

18:21 Hypertensive disorder;                                                                  5 

                                                                                                  

- Immunization history:: Adult Immunizations up to date.                                          

- Social history:: Smoking status: Patient denies any tobacco usage or history of.                

                                                                                                  

                                                                                                  

Vital Signs:                                                                                      

18:17  / 93; Pulse 64; Resp 16; Temp 98.6; Pulse Ox 100% ; Weight 124.28 kg; Height 5   [facility] 

      ft. 4 in. (162.56 cm); Pain 6/10;                                                           

18:17 Body Mass Index 47.03 (124.28 kg, 162.56 cm)                                            [facility] 

                                                                                                  

ED Course:                                                                                        

18:09 Patient arrived in ED.                                                                  as  

18:11 Amy Mcdowell is Private Physician.                                                   as  

18:18 Uday Acevedo PA is PHCP.                                                                cp  

18:18 Uday Morales MD is Attending Physician.                                             cp  

18:21 Triage completed.                                                                       [facility] 

18:21 Arm band placed on right wrist.                                                         [facility] 

18:37 Maria Teresa Rojas MD is Referral Physician.                                                    cp  

                                                                                                  

Administered Medications:                                                                         

No medications were administered                                                                  

                                                                                                  

                                                                                                  

Outcome:                                                                                          

18:38 Discharge ordered by MD.                                                                cp  

18:42 Patient left the ED.                                                                    [facility] 

                                                                                                  

Signatures:                                                                                       

Domonique Siu                             as                                                   

Uday Acevedo PA                         PA   cp                                                   

Quyen Asher, RN                       RN   [facility]                                                  

                                                                                                  

**************************************************************************************************

## 2023-02-22 NOTE — EDPHYS
Physician Documentation                                                                           

 [facility]                                                                 

Name: Surekha Mandel                                                                          

Age: 37 yrs                                                                                       

Sex: Female                                                                                       

: 1985                                                                                   

MRN: [id number]                                                                                   

Arrival Date: 2023                                                                          

Time: 18:09                                                                                       

Account#: [id number]                                                                            

Bed IW7                                                                                           

Private MD: Amy Mcdowell ED Physician Uday Morales                                                                      

HPI:                                                                                              

                                                                                             

18:35 This 37 yrs old Female presents to ER via Ambulatory with complaints of Drainage From   cp  

      Ear.                                                                                        

18:35 The patient presents with pain, that is acute, bloody drainage. The complaints affect   cp  

      the right ear. Onset: The symptoms/episode began/occurred 3 day(s) ago.                     

18:35 Associated signs and symptoms: Pertinent negatives: cough, fever, rhinorrhea, sinus     cp  

      trouble, sore throat, headache.                                                             

18:35 The patient has been recently seen at an urgent care, today, prescribed unknown ear     cp  

      drops and recommendation to f/u with ENT.                                                   

                                                                                                  

OB/GYN:                                                                                           

18:21 LMP N/A - Irregular menses                                                              [facility] 

                                                                                                  

Historical:                                                                                       

- Allergies:                                                                                      

18:21 No Known Allergies;                                                                     [facility] 

- PMHx:                                                                                           

18:21 Hypertensive disorder;                                                                  [facility] 

                                                                                                  

- Immunization history:: Adult Immunizations up to date.                                          

- Social history:: Smoking status: Patient denies any tobacco usage or history of.                

                                                                                                  

                                                                                                  

ROS:                                                                                              

18:35 ENT: Positive for drainage from ear(s), ear pain, Negative for sinus congestion, sinus  cp  

      pain, sore throat.                                                                          

18:35 Constitutional: Negative for body aches, chills, fever.                                 cp  

18:35 Neck: Negative for pain with movement, pain at rest, stiffness.                             

18:35 Respiratory: Negative for cough, shortness of breath, wheezing.                             

18:35 Abdomen/GI: Negative for abdominal pain, nausea, vomiting, and diarrhea.                    

18:35 Skin: Negative for rash.                                                                    

18:35 All other systems are negative.                                                             

                                                                                                  

Exam:                                                                                             

18:37 Constitutional: The patient appears in no acute distress, alert, awake, non-toxic, well cp  

      developed, well nourished.                                                                  

18:37 Head/Face:  Normocephalic, atraumatic.                                                  cp  

18:37 Eyes: Periorbital structures: appear normal, Conjunctiva: normal, no exudate, no            

      injection, Sclera: no appreciated abnormality, Lids and lashes: appear normal,              

      bilaterally.                                                                                

18:37 ENT: External ear(s): pain with movement, that is mild, of the  pinna of left ear and       

      left ear canal, Ear canal(s): mild erythema, mild swelling and dried blood noted            

      posterior mid canal, TM's: bulging, is not appreciated, bilaterally, erythema, is not       

      appreciated, bilaterally, Nose: is normal, Mouth: Lips: moist, Oral mucosa: pink and        

      intact, moist, Posterior pharynx: Airway: no evidence of obstruction, patent.               

18:37 Neck: ROM/movement: is normal, is supple, without pain, no range of motions                 

      limitations, Lymph nodes: no appreciated lymphadenopathy.                                   

18:37 Chest/axilla: Inspection: normal.                                                           

18:37 Cardiovascular: Rate: normal.                                                               

18:37 Respiratory: the patient does not display signs of respiratory distress,  Respirations:     

      normal.                                                                                     

18:37 Skin: cellulitis, is not appreciated, no rash present.                                  cp  

                                                                                                  

Vital Signs:                                                                                      

18:17  / 93; Pulse 64; Resp 16; Temp 98.6; Pulse Ox 100% ; Weight 124.28 kg; Height 5   [facility]5 

      ft. 4 in. (162.56 cm); Pain 6/10;                                                           

18:17 Body Mass Index 47.03 (124.28 kg, 162.56 cm)                                            [facility]5 

                                                                                                  

MDM:                                                                                              

18:30 Patient medically screened.                                                             cp  

18:37 Differential diagnosis: otitis media, otitis externa, ruptured TM, foreign body, acute  cp  

      otalgia, cerumen impaction, barotrauma .                                                    

18:38 Data reviewed: vital signs, nurses notes.                                               cp  

18:38 Counseling: I had a detailed discussion with the patient and/or guardian regarding: the cp  

      historical points, exam findings, and any diagnostic results supporting the                 

      discharge/admit diagnosis, the need for outpatient follow up, an ENT specialist, to         

      return to the emergency department if symptoms worsen or persist or if there are any        

      questions or concerns that arise at home.                                                   

                                                                                                  

Administered Medications:                                                                         

No medications were administered                                                                  

                                                                                                  

                                                                                                  

Disposition Summary:                                                                              

23 18:38                                                                                    

Discharge Ordered                                                                                 

      Location: Home                                                                          cp  

      Problem: new                                                                            cp  

      Symptoms: are unchanged                                                                 cp  

      Condition: Stable                                                                       cp  

      Diagnosis                                                                                   

        - Unspecified otitis externa, right ear                                               cp  

      Followup:                                                                               cp  

        - With: Maria Teresa Rojas MD                                                                      

        - When: 2 - 3 days                                                                         

        - Reason: Recheck today's complaints                                                       

      Discharge Instructions:                                                                     

        - Discharge Summary Sheet                                                             cp  

        - Ear Drops, Adult                                                                    cp  

        - Otitis Externa                                                                      cp  

      Forms:                                                                                      

        - Medication Reconciliation Form                                                      cp  

        - Thank You Letter                                                                    cp  

        - Antibiotic Education                                                                cp  

        - Prescription Opioid Use                                                             cp  

      Prescriptions:                                                                              

        - Augmentin 875-125 mg Oral Tablet                                                         

            - take 1 tablet by ORAL route every 12 hours for 10 days; 20 tablet; Refills: 0,  cp  

      Product Selection Permitted                                                                 

        - Ciprodex 0.3-0.1 % Otic Drops, Suspension                                                

            - instill 4 drops by OTIC route every 12 hours for 7 days , for ears ONLY; 1      cp  

      Container; Refills: 0, Product Selection Permitted                                          

Signatures:                                                                                       

Uday Acevedo PA PA cp Rees, Jessica, RN                       RN   jh5                                                  

                                                                                                  

**************************************************************************************************